# Patient Record
Sex: FEMALE | Race: WHITE | Employment: FULL TIME | URBAN - METROPOLITAN AREA
[De-identification: names, ages, dates, MRNs, and addresses within clinical notes are randomized per-mention and may not be internally consistent; named-entity substitution may affect disease eponyms.]

---

## 2018-12-28 ENCOUNTER — HOSPITAL ENCOUNTER (EMERGENCY)
Age: 33
Discharge: HOME | End: 2018-12-29
Attending: EMERGENCY MEDICINE
Payer: COMMERCIAL

## 2018-12-28 DIAGNOSIS — R04.0 EPISTAXIS: Primary | ICD-10-CM

## 2018-12-28 PROCEDURE — 75810000284 HC CNTRL NASAL HEMORHRAGE SIMPLE: Performed by: EMERGENCY MEDICINE

## 2018-12-28 PROCEDURE — 99283 EMERGENCY DEPT VISIT LOW MDM: CPT | Performed by: EMERGENCY MEDICINE

## 2018-12-29 VITALS
DIASTOLIC BLOOD PRESSURE: 80 MMHG | BODY MASS INDEX: 29.59 KG/M2 | HEIGHT: 63 IN | SYSTOLIC BLOOD PRESSURE: 129 MMHG | TEMPERATURE: 98 F | HEART RATE: 75 BPM | OXYGEN SATURATION: 100 % | WEIGHT: 167 LBS | RESPIRATION RATE: 16 BRPM

## 2018-12-29 PROCEDURE — 75810000284 HC CNTRL NASAL HEMORHRAGE SIMPLE: Performed by: EMERGENCY MEDICINE

## 2018-12-29 PROCEDURE — 74011250637 HC RX REV CODE- 250/637: Performed by: EMERGENCY MEDICINE

## 2018-12-29 RX ORDER — OXYMETAZOLINE HCL 0.05 %
2 SPRAY, NON-AEROSOL (ML) NASAL
Status: COMPLETED | OUTPATIENT
Start: 2018-12-29 | End: 2018-12-29

## 2018-12-29 RX ADMIN — OXYMETAZOLINE HYDROCHLORIDE 2 SPRAY: 5 SPRAY NASAL at 00:24

## 2018-12-29 NOTE — ED NOTES
I have reviewed discharge instructions with the patient. The patient verbalized understanding. Patient left ED via Discharge Method: ambulatory to Home with self. Opportunity for questions and clarification provided. Patient given 2 scripts. To continue your aftercare when you leave the hospital, you may receive an automated call from our care team to check in on how you are doing. This is a free service and part of our promise to provide the best care and service to meet your aftercare needs.  If you have questions, or wish to unsubscribe from this service please call [phone]. Thank you for Choosing our [facility] Emergency Department.

## 2018-12-29 NOTE — ED PROVIDER NOTES
The history is provided by the patient. Epistaxis This is a recurrent problem. The current episode started 6 to 12 hours ago. The problem occurs constantly. The problem has not changed since onset. The problem is associated with an unknown factor. The bleeding has been from both nares. She has tried ice and applying pressure for the symptoms. The treatment provided no relief. Her past medical history is significant for sinus problems. Her past medical history does not include bleeding disorder, colds, frequent nosebleeds, cancer, HTN, allergies or trauma. Past Medical History:  
Diagnosis Date  Hypertension History reviewed. No pertinent surgical history. History reviewed. No pertinent family history. Social History Socioeconomic History  Marital status:  Spouse name: Not on file  Number of children: Not on file  Years of education: Not on file  Highest education level: Not on file Social Needs  Financial resource strain: Not on file  Food insecurity - worry: Not on file  Food insecurity - inability: Not on file  Transportation needs - medical: Not on file  Transportation needs - non-medical: Not on file Occupational History  Not on file Tobacco Use  Smoking status: Never Smoker  Smokeless tobacco: Never Used Substance and Sexual Activity  Alcohol use: No  
  Frequency: Never  Drug use: No  
 Sexual activity: Not on file Other Topics Concern  Not on file Social History Narrative  Not on file ALLERGIES: Patient has no known allergies. Review of Systems HENT: Positive for nosebleeds. All other systems reviewed and are negative. Vitals:  
 12/28/18 2230 BP: 154/82 Pulse: 76 Resp: 18 Temp: 97.9 °F (36.6 °C) SpO2: 100% Weight: 75.8 kg (167 lb) Height: 5' 3\" (1.6 m) Physical Exam  
Constitutional: She is oriented to person, place, and time.  She appears well-developed and well-nourished. HENT:  
Head: Normocephalic and atraumatic. Nose: Epistaxis is observed. Eyes: Conjunctivae are normal. Pupils are equal, round, and reactive to light. Neck: Neck supple. Musculoskeletal: Normal range of motion. Neurological: She is alert and oriented to person, place, and time. Skin: Skin is warm and dry. Nursing note and vitals reviewed. MDM Number of Diagnoses or Management Options Epistaxis:  
Diagnosis management comments: 79-year-old female presenting for epistaxis. History of deviated septum. Risk of Complications, Morbidity, and/or Mortality Presenting problems: moderate Diagnostic procedures: moderate Management options: moderate Patient Progress Patient progress: resolved Epistaxis Management Date/Time: 12/29/2018 2:20 AM 
Performed by: Suzie Bains MD 
Authorized by: Suzie Bains MD  
 
Consent:  
  Consent obtained:  Verbal 
  Consent given by:  Patient Risks discussed:  Bleeding Alternatives discussed:  No treatment Anesthesia (see MAR for exact dosages): Anesthesia method:  None Procedure details:  
  Treatment site:  L anterior and R anterior Treatment method: Thrombin Treatment complexity:  Limited Treatment episode: initial   
Post-procedure details:  
  Assessment:  Bleeding stopped Patient tolerance of procedure: Tolerated well, no immediate complications

## 2018-12-29 NOTE — ED TRIAGE NOTES
Pt presents to the ED with nose bleed which has been occurring all day,  Reports hx of nose bleed x 2 years ago after having broken septum.